# Patient Record
Sex: FEMALE | Race: WHITE | NOT HISPANIC OR LATINO | URBAN - METROPOLITAN AREA
[De-identification: names, ages, dates, MRNs, and addresses within clinical notes are randomized per-mention and may not be internally consistent; named-entity substitution may affect disease eponyms.]

---

## 2018-03-09 VITALS
DIASTOLIC BLOOD PRESSURE: 79 MMHG | RESPIRATION RATE: 18 BRPM | SYSTOLIC BLOOD PRESSURE: 129 MMHG | HEIGHT: 66 IN | HEART RATE: 78 BPM | OXYGEN SATURATION: 100 % | WEIGHT: 188.5 LBS | TEMPERATURE: 98 F

## 2018-03-09 NOTE — PATIENT PROFILE ADULT. - PSH
H/O abdominoplasty    H/O bilateral mastectomy Ankle fracture  left, s/p surgery  Clavicle fracture  s/p surgery  H/O abdominoplasty    H/O bilateral mastectomy  prophlatic  H/O:  section    History of ovarian cystectomy

## 2018-03-09 NOTE — PATIENT PROFILE ADULT. - PMH
Hodgkins disease  s/p chemo, radiation  Hypothyroidism GERD (gastroesophageal reflux disease)    Hodgkins disease  s/p chemo, radiation  Hypothyroidism

## 2018-03-12 ENCOUNTER — INPATIENT (INPATIENT)
Facility: HOSPITAL | Age: 48
LOS: 2 days | Discharge: HOME | DRG: 743 | End: 2018-03-15
Attending: OBSTETRICS & GYNECOLOGY | Admitting: OBSTETRICS & GYNECOLOGY
Payer: COMMERCIAL

## 2018-03-12 DIAGNOSIS — N83.11 CORPUS LUTEUM CYST OF RIGHT OVARY: ICD-10-CM

## 2018-03-12 DIAGNOSIS — S82.899A OTHER FRACTURE OF UNSPECIFIED LOWER LEG, INITIAL ENCOUNTER FOR CLOSED FRACTURE: Chronic | ICD-10-CM

## 2018-03-12 DIAGNOSIS — Z92.3 PERSONAL HISTORY OF IRRADIATION: ICD-10-CM

## 2018-03-12 DIAGNOSIS — Z98.890 OTHER SPECIFIED POSTPROCEDURAL STATES: Chronic | ICD-10-CM

## 2018-03-12 DIAGNOSIS — Z85.72 PERSONAL HISTORY OF NON-HODGKIN LYMPHOMAS: ICD-10-CM

## 2018-03-12 DIAGNOSIS — S42.009A FRACTURE OF UNSPECIFIED PART OF UNSPECIFIED CLAVICLE, INITIAL ENCOUNTER FOR CLOSED FRACTURE: Chronic | ICD-10-CM

## 2018-03-12 DIAGNOSIS — D25.9 LEIOMYOMA OF UTERUS, UNSPECIFIED: ICD-10-CM

## 2018-03-12 DIAGNOSIS — N92.0 EXCESSIVE AND FREQUENT MENSTRUATION WITH REGULAR CYCLE: ICD-10-CM

## 2018-03-12 DIAGNOSIS — D64.9 ANEMIA, UNSPECIFIED: ICD-10-CM

## 2018-03-12 DIAGNOSIS — Z98.890 OTHER SPECIFIED POSTPROCEDURAL STATES: ICD-10-CM

## 2018-03-12 DIAGNOSIS — Z98.891 HISTORY OF UTERINE SCAR FROM PREVIOUS SURGERY: Chronic | ICD-10-CM

## 2018-03-12 DIAGNOSIS — E03.9 HYPOTHYROIDISM, UNSPECIFIED: ICD-10-CM

## 2018-03-12 DIAGNOSIS — K21.9 GASTRO-ESOPHAGEAL REFLUX DISEASE WITHOUT ESOPHAGITIS: ICD-10-CM

## 2018-03-12 LAB
HCT VFR BLD CALC: 33.8 % — LOW (ref 34.5–45)
HGB BLD-MCNC: 11.2 G/DL — LOW (ref 11.5–15.5)
MCHC RBC-ENTMCNC: 28.2 PG — SIGNIFICANT CHANGE UP (ref 27–34)
MCHC RBC-ENTMCNC: 33.1 G/DL — SIGNIFICANT CHANGE UP (ref 32–36)
MCV RBC AUTO: 85.1 FL — SIGNIFICANT CHANGE UP (ref 80–100)
PLATELET # BLD AUTO: 242 K/UL — SIGNIFICANT CHANGE UP (ref 150–400)
RBC # BLD: 3.97 M/UL — SIGNIFICANT CHANGE UP (ref 3.8–5.2)
RBC # FLD: 14.6 % — SIGNIFICANT CHANGE UP (ref 10.3–16.9)
WBC # BLD: 13.8 K/UL — HIGH (ref 3.8–10.5)
WBC # FLD AUTO: 13.8 K/UL — HIGH (ref 3.8–10.5)

## 2018-03-12 RX ORDER — HYDROMORPHONE HYDROCHLORIDE 2 MG/ML
30 INJECTION INTRAMUSCULAR; INTRAVENOUS; SUBCUTANEOUS
Qty: 0 | Refills: 0 | Status: DISCONTINUED | OUTPATIENT
Start: 2018-03-12 | End: 2018-03-12

## 2018-03-12 RX ORDER — ONDANSETRON 8 MG/1
4 TABLET, FILM COATED ORAL EVERY 6 HOURS
Qty: 0 | Refills: 0 | Status: DISCONTINUED | OUTPATIENT
Start: 2018-03-12 | End: 2018-03-15

## 2018-03-12 RX ORDER — MORPHINE SULFATE 50 MG/1
30 CAPSULE, EXTENDED RELEASE ORAL
Qty: 0 | Refills: 0 | Status: DISCONTINUED | OUTPATIENT
Start: 2018-03-12 | End: 2018-03-13

## 2018-03-12 RX ORDER — SIMETHICONE 80 MG/1
80 TABLET, CHEWABLE ORAL
Qty: 0 | Refills: 0 | Status: DISCONTINUED | OUTPATIENT
Start: 2018-03-12 | End: 2018-03-15

## 2018-03-12 RX ORDER — BUPIVACAINE 13.3 MG/ML
20 INJECTION, SUSPENSION, LIPOSOMAL INFILTRATION ONCE
Qty: 0 | Refills: 0 | Status: DISCONTINUED | OUTPATIENT
Start: 2018-03-12 | End: 2018-03-13

## 2018-03-12 RX ORDER — SODIUM CHLORIDE 9 MG/ML
1000 INJECTION, SOLUTION INTRAVENOUS
Qty: 0 | Refills: 0 | Status: DISCONTINUED | OUTPATIENT
Start: 2018-03-12 | End: 2018-03-13

## 2018-03-12 RX ORDER — HYDROMORPHONE HYDROCHLORIDE 2 MG/ML
0.5 INJECTION INTRAMUSCULAR; INTRAVENOUS; SUBCUTANEOUS ONCE
Qty: 0 | Refills: 0 | Status: DISCONTINUED | OUTPATIENT
Start: 2018-03-12 | End: 2018-03-15

## 2018-03-12 RX ORDER — NALOXONE HYDROCHLORIDE 4 MG/.1ML
0.1 SPRAY NASAL
Qty: 0 | Refills: 0 | Status: DISCONTINUED | OUTPATIENT
Start: 2018-03-12 | End: 2018-03-13

## 2018-03-12 RX ORDER — MORPHINE SULFATE 50 MG/1
2 CAPSULE, EXTENDED RELEASE ORAL
Qty: 0 | Refills: 0 | Status: DISCONTINUED | OUTPATIENT
Start: 2018-03-12 | End: 2018-03-14

## 2018-03-12 RX ORDER — HEPARIN SODIUM 5000 [USP'U]/ML
5000 INJECTION INTRAVENOUS; SUBCUTANEOUS EVERY 12 HOURS
Qty: 0 | Refills: 0 | Status: DISCONTINUED | OUTPATIENT
Start: 2018-03-12 | End: 2018-03-15

## 2018-03-12 RX ADMIN — MORPHINE SULFATE 30 MILLILITER(S): 50 CAPSULE, EXTENDED RELEASE ORAL at 18:50

## 2018-03-12 RX ADMIN — ONDANSETRON 4 MILLIGRAM(S): 8 TABLET, FILM COATED ORAL at 21:46

## 2018-03-12 NOTE — PROGRESS NOTE ADULT - ASSESSMENT
47y Female POD#0 s/p SENIA, BS, JUANJO, and TAP block, stable.     1. Neuro/Pain:  PCA  2  CV:   VS per routine  3. Pulm: Encourage ISS  4. GI: NPO + Sips, Advance in the AM  5. :  [place]illo in place, TOV in AM  6. Heme: PACU CBC stable, f/u AM CBC  7. ID: --  8. DVT ppx: SCDs  9. Dispo: Likely POD#2 or 3

## 2018-03-12 NOTE — PROGRESS NOTE ADULT - SUBJECTIVE AND OBJECTIVE BOX
Patient seen at bedside for post op check. Pain is well controlled with a PCA. Patient has a aden and SCDs. Has not yet passed flatus or ambulated. NPO. Denies headache, dizziness, nausea/vomiting, shortness of breath, palpitations, heavy bleeding.     T(C): 36 (03-12-18 @ 22:00), Max: 36.3 (03-12-18 @ 21:25)  HR: 82 (03-12-18 @ 22:00) (80 - 94)  BP: 123/72 (03-12-18 @ 22:00) (114/82 - 131/69)  RR: 16 (03-12-18 @ 22:00) (9 - 24)  SpO2: 98% (03-12-18 @ 22:00) (98% - 100%)    Gen: NAD, AO x3  Chest: normal work of breathing  Abdomen: soft, nontender, nondistended, no rebound or guarding, normal bowel sounds  Incision: dressing clean, dry, intact  : aden draining clear urine  Extremities: SCDs on      03-12-18 @ 07:01  -  03-12-18 @ 23:31  --------------------------------------------------------  IN: 625 mL / OUT: 385 mL / NET: 240 mL                          11.2   13.8  )-----------( 242      ( 12 Mar 2018 20:31 )             33.8

## 2018-03-13 LAB
ANION GAP SERPL CALC-SCNC: 12 MMOL/L — SIGNIFICANT CHANGE UP (ref 5–17)
BUN SERPL-MCNC: 12 MG/DL — SIGNIFICANT CHANGE UP (ref 7–23)
CALCIUM SERPL-MCNC: 9 MG/DL — SIGNIFICANT CHANGE UP (ref 8.4–10.5)
CHLORIDE SERPL-SCNC: 101 MMOL/L — SIGNIFICANT CHANGE UP (ref 96–108)
CO2 SERPL-SCNC: 23 MMOL/L — SIGNIFICANT CHANGE UP (ref 22–31)
CREAT SERPL-MCNC: 0.62 MG/DL — SIGNIFICANT CHANGE UP (ref 0.5–1.3)
GLUCOSE SERPL-MCNC: 141 MG/DL — HIGH (ref 70–99)
HCT VFR BLD CALC: 32.4 % — LOW (ref 34.5–45)
HGB BLD-MCNC: 10.7 G/DL — LOW (ref 11.5–15.5)
MAGNESIUM SERPL-MCNC: 2 MG/DL — SIGNIFICANT CHANGE UP (ref 1.6–2.6)
MCHC RBC-ENTMCNC: 28.3 PG — SIGNIFICANT CHANGE UP (ref 27–34)
MCHC RBC-ENTMCNC: 33 G/DL — SIGNIFICANT CHANGE UP (ref 32–36)
MCV RBC AUTO: 85.7 FL — SIGNIFICANT CHANGE UP (ref 80–100)
PHOSPHATE SERPL-MCNC: 3.8 MG/DL — SIGNIFICANT CHANGE UP (ref 2.5–4.5)
PLATELET # BLD AUTO: 281 K/UL — SIGNIFICANT CHANGE UP (ref 150–400)
POTASSIUM SERPL-MCNC: 4.4 MMOL/L — SIGNIFICANT CHANGE UP (ref 3.5–5.3)
POTASSIUM SERPL-SCNC: 4.4 MMOL/L — SIGNIFICANT CHANGE UP (ref 3.5–5.3)
RBC # BLD: 3.78 M/UL — LOW (ref 3.8–5.2)
RBC # FLD: 14.6 % — SIGNIFICANT CHANGE UP (ref 10.3–16.9)
SODIUM SERPL-SCNC: 136 MMOL/L — SIGNIFICANT CHANGE UP (ref 135–145)
WBC # BLD: 11.6 K/UL — HIGH (ref 3.8–10.5)
WBC # FLD AUTO: 11.6 K/UL — HIGH (ref 3.8–10.5)

## 2018-03-13 RX ORDER — LEVOTHYROXINE SODIUM 125 MCG
150 TABLET ORAL DAILY
Qty: 0 | Refills: 0 | Status: DISCONTINUED | OUTPATIENT
Start: 2018-03-13 | End: 2018-03-15

## 2018-03-13 RX ORDER — BENZOCAINE AND MENTHOL 5; 1 G/100ML; G/100ML
1 LIQUID ORAL
Qty: 0 | Refills: 0 | Status: DISCONTINUED | OUTPATIENT
Start: 2018-03-13 | End: 2018-03-15

## 2018-03-13 RX ORDER — ACETAMINOPHEN 500 MG
975 TABLET ORAL EVERY 8 HOURS
Qty: 0 | Refills: 0 | Status: COMPLETED | OUTPATIENT
Start: 2018-03-13 | End: 2018-03-13

## 2018-03-13 RX ORDER — DOCUSATE SODIUM 100 MG
100 CAPSULE ORAL DAILY
Qty: 0 | Refills: 0 | Status: DISCONTINUED | OUTPATIENT
Start: 2018-03-13 | End: 2018-03-15

## 2018-03-13 RX ORDER — IBUPROFEN 200 MG
600 TABLET ORAL EVERY 6 HOURS
Qty: 0 | Refills: 0 | Status: DISCONTINUED | OUTPATIENT
Start: 2018-03-13 | End: 2018-03-15

## 2018-03-13 RX ORDER — OXYCODONE AND ACETAMINOPHEN 5; 325 MG/1; MG/1
1 TABLET ORAL EVERY 4 HOURS
Qty: 0 | Refills: 0 | Status: DISCONTINUED | OUTPATIENT
Start: 2018-03-13 | End: 2018-03-15

## 2018-03-13 RX ORDER — OXYCODONE AND ACETAMINOPHEN 5; 325 MG/1; MG/1
2 TABLET ORAL EVERY 4 HOURS
Qty: 0 | Refills: 0 | Status: DISCONTINUED | OUTPATIENT
Start: 2018-03-13 | End: 2018-03-15

## 2018-03-13 RX ADMIN — ONDANSETRON 4 MILLIGRAM(S): 8 TABLET, FILM COATED ORAL at 07:00

## 2018-03-13 RX ADMIN — Medication 30 MILLILITER(S): at 22:33

## 2018-03-13 RX ADMIN — SIMETHICONE 80 MILLIGRAM(S): 80 TABLET, CHEWABLE ORAL at 21:22

## 2018-03-13 RX ADMIN — SIMETHICONE 80 MILLIGRAM(S): 80 TABLET, CHEWABLE ORAL at 17:57

## 2018-03-13 RX ADMIN — Medication 975 MILLIGRAM(S): at 08:00

## 2018-03-13 RX ADMIN — Medication 100 MILLIGRAM(S): at 09:31

## 2018-03-13 RX ADMIN — OXYCODONE AND ACETAMINOPHEN 1 TABLET(S): 5; 325 TABLET ORAL at 22:31

## 2018-03-13 RX ADMIN — SIMETHICONE 80 MILLIGRAM(S): 80 TABLET, CHEWABLE ORAL at 05:50

## 2018-03-13 RX ADMIN — HEPARIN SODIUM 5000 UNIT(S): 5000 INJECTION INTRAVENOUS; SUBCUTANEOUS at 05:50

## 2018-03-13 RX ADMIN — OXYCODONE AND ACETAMINOPHEN 1 TABLET(S): 5; 325 TABLET ORAL at 21:22

## 2018-03-13 RX ADMIN — Medication 975 MILLIGRAM(S): at 07:14

## 2018-03-13 RX ADMIN — SIMETHICONE 80 MILLIGRAM(S): 80 TABLET, CHEWABLE ORAL at 00:19

## 2018-03-13 RX ADMIN — HEPARIN SODIUM 5000 UNIT(S): 5000 INJECTION INTRAVENOUS; SUBCUTANEOUS at 17:57

## 2018-03-13 RX ADMIN — Medication 600 MILLIGRAM(S): at 12:25

## 2018-03-13 RX ADMIN — Medication 150 MICROGRAM(S): at 09:31

## 2018-03-13 RX ADMIN — SIMETHICONE 80 MILLIGRAM(S): 80 TABLET, CHEWABLE ORAL at 11:36

## 2018-03-13 RX ADMIN — Medication 600 MILLIGRAM(S): at 13:25

## 2018-03-13 NOTE — PROGRESS NOTE ADULT - SUBJECTIVE AND OBJECTIVE BOX
Patient evaluated at bedside. No acute events overnight. She reports pain is well controlled with PCA. Adequate urine output. Has not yet had anything PO. Ambulating w/o issue. Has not passed flatus as of yet. She denies headache, dizziness, chest pain, palpitations, shortness of breathe, nausea, vomiting or heavy vaginal bleeding.      Physical Exam:  Vital Signs Last 24 Hrs  T(C): 35.9 (13 Mar 2018 05:01), Max: 36.3 (12 Mar 2018 21:25)  T(F): 96.6 (13 Mar 2018 05:01), Max: 97.4 (12 Mar 2018 21:25)  HR: 89 (13 Mar 2018 05:01) (77 - 94)  BP: 97/62 (13 Mar 2018 05:01) (97/62 - 131/69)  BP(mean): 103 (12 Mar 2018 19:33) (83 - 103)  RR: 16 (13 Mar 2018 05:01) (9 - 24)  SpO2: 97% (13 Mar 2018 05:01) (97% - 100%)    GA: NAD, A+0 x 3  Abd: + BS, soft, nontender, nondistended, no rebound or guarding  Incision clean, dry and intact - steri strips  Extremities: no swelling or calf tenderness                            11.2   13.8  )-----------( 242      ( 12 Mar 2018 20:31 )             33.8

## 2018-03-13 NOTE — PROGRESS NOTE ADULT - SUBJECTIVE AND OBJECTIVE BOX
Patient stable afebrile, nausea resolved, VSS  tolerated po liquids after zofran, no flatus  Abdomen slightly distended, non tender  Incision clean and dry  Advance diet with flatus

## 2018-03-13 NOTE — PROGRESS NOTE ADULT - ASSESSMENT
46yo POD1 s/p supracervical hysterectomy, bilateral salpingectomy, lysis of adhesions, TAP block. Pt is hemodynamically stable.     1. VSS- continue to monitor per protocol  2. Pain control with Percocet or Motrin as needed   3. DVT ppx: SCDs  4. Pulm: incentive spirometer at least 10 times per hour   5. GI: Diet clears   6. Activity- ambulate as tolerated

## 2018-03-13 NOTE — PROGRESS NOTE ADULT - SUBJECTIVE AND OBJECTIVE BOX
Pt seen and examined at bedside. Pt states mild abdominal pain controlled with pain medication. Pt is ambulating, tolerating clear diet, did not pass flatus yet. Pt is urinating adequately.   Pt denies fever, chills, chest pain, SOB, nausea, vomiting, lightheadedness, dizziness.      T(F): 97 (03-13-18 @ 10:13), Max: 97.4 (03-12-18 @ 21:25)  HR: 84 (03-13-18 @ 10:13) (77 - 94)  BP: 96/68 (03-13-18 @ 10:13) (96/68 - 131/69)  RR: 20 (03-13-18 @ 10:13) (9 - 24)  SpO2: 96% (03-13-18 @ 10:13) (96% - 100%)  Wt(kg): --  I&O's Summary    12 Mar 2018 07:01  -  13 Mar 2018 07:00  --------------------------------------------------------  IN: 1875 mL / OUT: 1285 mL / NET: 590 mL    13 Mar 2018 07:01  -  13 Mar 2018 12:37  --------------------------------------------------------  IN: 0 mL / OUT: 200 mL / NET: -200 mL    MEDICATIONS  (STANDING):  docusate sodium 100 milliGRAM(s) Oral daily  heparin  Injectable 5000 Unit(s) SubCutaneous every 12 hours  HYDROmorphone  Injectable 0.5 milliGRAM(s) IV Push once  levothyroxine 150 MICROGram(s) Oral daily  simethicone 80 milliGRAM(s) Chew five times a day    MEDICATIONS  (PRN):  ibuprofen  Tablet 600 milliGRAM(s) Oral every 6 hours PRN mild pain  morphine  - Injectable 2 milliGRAM(s) IV Push every 10 minutes PRN Severe Pain (7 - 10)  ondansetron Injectable 4 milliGRAM(s) IV Push every 6 hours PRN Nausea  oxyCODONE    5 mG/acetaminophen 325 mG 2 Tablet(s) Oral every 4 hours PRN Severe Pain (7 - 10)  oxyCODONE    5 mG/acetaminophen 325 mG 1 Tablet(s) Oral every 4 hours PRN Moderate Pain (4 - 6)    Physical Exam:  Constitutional: NAD  Abdomen: incision site clean, dry, intact. Soft, mildly tender, nondistended, no guarding, no rebound, +bowel sounds  Extremities: no lower extremity edema or calve tenderness. SCDs in place     LABS:                        10.7   11.6  )-----------( 281      ( 13 Mar 2018 06:56 )             32.4     03-13    136  |  101  |  12  ----------------------------<  141<H>  4.4   |  23  |  0.62    Ca    9.0      13 Mar 2018 06:56  Phos  3.8     03-13  Mg     2.0     03-13

## 2018-03-13 NOTE — PROGRESS NOTE ADULT - ASSESSMENT
47y Female POD#1 s/p SENIA, BS, JUANJO, and TAP block, stable.     1. Neuro/Pain:  OPM  2  CV:   VS per routine  3. Pulm: Encourage ISS  4. GI: NPO + Sips, Advance to clears in the AM  5. :  TOV  6. Heme: PACU CBC stable, f/u AM CBC  7. ID: --  8. DVT ppx: SCDs  9. Dispo: Likely POD#2 or 3

## 2018-03-14 RX ORDER — METFORMIN HYDROCHLORIDE 850 MG/1
1 TABLET ORAL
Qty: 0 | Refills: 0 | COMMUNITY

## 2018-03-14 RX ORDER — DIPHENHYDRAMINE HCL 50 MG
25 CAPSULE ORAL AT BEDTIME
Qty: 0 | Refills: 0 | Status: DISCONTINUED | OUTPATIENT
Start: 2018-03-14 | End: 2018-03-15

## 2018-03-14 RX ORDER — LEVOTHYROXINE SODIUM 125 MCG
1 TABLET ORAL
Qty: 0 | Refills: 0 | COMMUNITY

## 2018-03-14 RX ORDER — DIPHENHYDRAMINE HCL 50 MG
25 CAPSULE ORAL ONCE
Qty: 0 | Refills: 0 | Status: COMPLETED | OUTPATIENT
Start: 2018-03-14 | End: 2018-03-14

## 2018-03-14 RX ADMIN — Medication 600 MILLIGRAM(S): at 13:19

## 2018-03-14 RX ADMIN — SIMETHICONE 80 MILLIGRAM(S): 80 TABLET, CHEWABLE ORAL at 06:24

## 2018-03-14 RX ADMIN — Medication 600 MILLIGRAM(S): at 05:23

## 2018-03-14 RX ADMIN — OXYCODONE AND ACETAMINOPHEN 1 TABLET(S): 5; 325 TABLET ORAL at 10:50

## 2018-03-14 RX ADMIN — OXYCODONE AND ACETAMINOPHEN 1 TABLET(S): 5; 325 TABLET ORAL at 04:14

## 2018-03-14 RX ADMIN — OXYCODONE AND ACETAMINOPHEN 1 TABLET(S): 5; 325 TABLET ORAL at 16:45

## 2018-03-14 RX ADMIN — Medication 600 MILLIGRAM(S): at 14:00

## 2018-03-14 RX ADMIN — SIMETHICONE 80 MILLIGRAM(S): 80 TABLET, CHEWABLE ORAL at 22:08

## 2018-03-14 RX ADMIN — HEPARIN SODIUM 5000 UNIT(S): 5000 INJECTION INTRAVENOUS; SUBCUTANEOUS at 18:39

## 2018-03-14 RX ADMIN — Medication 600 MILLIGRAM(S): at 00:16

## 2018-03-14 RX ADMIN — Medication 150 MICROGRAM(S): at 06:24

## 2018-03-14 RX ADMIN — Medication 600 MILLIGRAM(S): at 08:20

## 2018-03-14 RX ADMIN — Medication 600 MILLIGRAM(S): at 22:07

## 2018-03-14 RX ADMIN — Medication 25 MILLIGRAM(S): at 22:08

## 2018-03-14 RX ADMIN — HEPARIN SODIUM 5000 UNIT(S): 5000 INJECTION INTRAVENOUS; SUBCUTANEOUS at 06:24

## 2018-03-14 RX ADMIN — OXYCODONE AND ACETAMINOPHEN 1 TABLET(S): 5; 325 TABLET ORAL at 16:09

## 2018-03-14 RX ADMIN — OXYCODONE AND ACETAMINOPHEN 1 TABLET(S): 5; 325 TABLET ORAL at 11:30

## 2018-03-14 RX ADMIN — Medication 25 MILLIGRAM(S): at 23:02

## 2018-03-14 RX ADMIN — Medication 600 MILLIGRAM(S): at 06:28

## 2018-03-14 RX ADMIN — OXYCODONE AND ACETAMINOPHEN 1 TABLET(S): 5; 325 TABLET ORAL at 04:47

## 2018-03-14 RX ADMIN — Medication 600 MILLIGRAM(S): at 23:02

## 2018-03-14 RX ADMIN — OXYCODONE AND ACETAMINOPHEN 2 TABLET(S): 5; 325 TABLET ORAL at 23:38

## 2018-03-14 RX ADMIN — OXYCODONE AND ACETAMINOPHEN 2 TABLET(S): 5; 325 TABLET ORAL at 23:02

## 2018-03-14 RX ADMIN — SIMETHICONE 80 MILLIGRAM(S): 80 TABLET, CHEWABLE ORAL at 16:09

## 2018-03-14 RX ADMIN — Medication 100 MILLIGRAM(S): at 10:50

## 2018-03-14 RX ADMIN — BENZOCAINE AND MENTHOL 1 LOZENGE: 5; 1 LIQUID ORAL at 00:16

## 2018-03-14 RX ADMIN — SIMETHICONE 80 MILLIGRAM(S): 80 TABLET, CHEWABLE ORAL at 10:50

## 2018-03-14 NOTE — PROGRESS NOTE ADULT - ASSESSMENT
46yo POD2 s/p supracervical hysterectomy, bilateral salpingectomy, lysis of adhesions, TAP block. Pt is hemodynamically stable.     1. VSS- continue to monitor per protocol  2. Pain control with Percocet or Motrin as needed   3. DVT ppx: SCDs  4. Pulm: incentive spirometer at least 10 times per hour   5. GI: Diet reg  6. Activity- ambulate as tolerated

## 2018-03-14 NOTE — DISCHARGE NOTE ADULT - CARE PLAN
Principal Discharge DX:	Uterine leiomyoma, unspecified location  Goal:	recovery  Assessment and plan of treatment:	Follow up with your GYN in 1-2 weeks. Call the office to schedule your appointment  Regular diet. No heavy lifting. Pelvic rest for 6 weeks.  This includes no tampons, douching or intercourse. Call with any signs of symptoms of infection including fever > 100.4 degrees, severe pain, malodorous vaginal discharge or bleeding > 1 pad/hour. Motrin 600 mg orally every 6 hours for pain.

## 2018-03-14 NOTE — DISCHARGE NOTE ADULT - PLAN OF CARE
recovery Follow up with your GYN in 1-2 weeks. Call the office to schedule your appointment  Regular diet. No heavy lifting. Pelvic rest for 6 weeks.  This includes no tampons, douching or intercourse. Call with any signs of symptoms of infection including fever > 100.4 degrees, severe pain, malodorous vaginal discharge or bleeding > 1 pad/hour. Motrin 600 mg orally every 6 hours for pain.

## 2018-03-14 NOTE — PROGRESS NOTE ADULT - SUBJECTIVE AND OBJECTIVE BOX
Patient seen at bedside for complaints of chills and abdominal pressure. Feeling better since nurse gave her motrin. Afebrile. Ate sweet potatoe and quinoa for dinner, ambulating, voiding, passing flatus. Took a benadryl but does not feel somnolent.    Vital Signs Last 24 Hrs  T(C): 36.7 (14 Mar 2018 21:16), Max: 37.1 (14 Mar 2018 06:33)  T(F): 98.1 (14 Mar 2018 21:16), Max: 98.8 (14 Mar 2018 06:33)  HR: 81 (14 Mar 2018 21:16) (81 - 95)  BP: 94/56 (14 Mar 2018 21:16) (94/56 - 107/58)  BP(mean): --  RR: 16 (14 Mar 2018 21:16) (16 - 18)  SpO2: 97% (14 Mar 2018 16:06) (96% - 99%)    Gen: NAD, AOx3  Chest: no respiratory distress  Abdomen: soft, nondistended, nontender, no rebound/guarding  Incision: well approximated, no erythema or drainage  Extremites: no warmth, edema, or calf tenderness    A/P POD2 s/p SENIA, BS, JUANJO, no signs of infection, afebrile, benign exam.  -Continue pain control-patient due for oxycodone now, nurse to administer  -Encourage incentive spirometer  -Will give an additional 25 mg of benadryl prn insomnia  -Continue to monitor VS and signs of infection

## 2018-03-14 NOTE — PROGRESS NOTE ADULT - SUBJECTIVE AND OBJECTIVE BOX
Patient evaluated at bedside. She reports pain is well controlled with OPM. She has been ambulating without assistance, voiding spontaneously, passing gas, tolerating PO. She denies headache, dizziness, chest pain, palpitations, shortness of breath, nausea, vomiting or heavy vaginal bleeding.      Physical Exam:  Vital Signs Last 24 Hrs  T(C): 37.1 (14 Mar 2018 06:33), Max: 37.1 (13 Mar 2018 21:16)  T(F): 98.8 (14 Mar 2018 06:33), Max: 98.8 (14 Mar 2018 06:33)  HR: 88 (14 Mar 2018 06:33) (84 - 101)  BP: 100/71 (14 Mar 2018 06:33) (96/68 - 112/75)  BP(mean): --  RR: 18 (14 Mar 2018 06:33) (18 - 20)  SpO2: 99% (14 Mar 2018 06:33) (96% - 99%)    GA: NAD, A+0 x 3  Abd: + BS, soft, nontender, nondistended, no rebound or guarding,   Incision clean, dry and intact  : lochia WNL  Extremities: no swelling or calf tenderness                            10.7   11.6  )-----------( 281      ( 13 Mar 2018 06:56 )             32.4     03-13    136  |  101  |  12  ----------------------------<  141<H>  4.4   |  23  |  0.62    Ca    9.0      13 Mar 2018 06:56  Phos  3.8     03-13  Mg     2.0     03-13

## 2018-03-14 NOTE — PROGRESS NOTE ADULT - SUBJECTIVE AND OBJECTIVE BOX
Patient evaluated at bedside. She reports pain is well controlled with OPM. She has been ambulating without assistance, voiding spontaneously, passing gas, tolerating regular diet. She denies headache, dizziness, chest pain, palpitations, shortness of breath, nausea, vomiting or heavy vaginal bleeding.      Physical Exam:  Vital Signs Last 24 Hrs  T(C): 36.4 (14 Mar 2018 16:06), Max: 37.1 (13 Mar 2018 21:16)  T(F): 97.5 (14 Mar 2018 16:06), Max: 98.8 (14 Mar 2018 06:33)  HR: 95 (14 Mar 2018 16:06) (82 - 101)  BP: 107/58 (14 Mar 2018 16:06) (100/71 - 112/75)  BP(mean): --  RR: 16 (14 Mar 2018 16:06) (16 - 18)  SpO2: 97% (14 Mar 2018 16:06) (96% - 99%)    GA: NAD, A+0 x 3  Abd: + BS, soft, nontender, nondistended, no rebound or guarding  Incision clean, dry and intact  : lochia WNL  Extremities: no swelling or calf tenderness                            10.7   11.6  )-----------( 281      ( 13 Mar 2018 06:56 )             32.4     03-13    136  |  101  |  12  ----------------------------<  141<H>  4.4   |  23  |  0.62    Ca    9.0      13 Mar 2018 06:56  Phos  3.8     03-13  Mg     2.0     03-13

## 2018-03-14 NOTE — DISCHARGE NOTE ADULT - PATIENT PORTAL LINK FT
You can access the [facility] Patient Portal, offered by [facility], by registering with the following website: [email or web address]

## 2018-03-14 NOTE — PROGRESS NOTE ADULT - ASSESSMENT
48yo POD2 s/p supracervical hysterectomy, bilateral salpingectomy, lysis of adhesions, TAP block. Pt is hemodynamically stable.     1. VSS- continue to monitor per protocol  2. Pain control with Percocet or Motrin as needed   3. DVT ppx: SCDs  4. Pulm: incentive spirometer at least 10 times per hour   5. GI: Diet reg  6. Activity- ambulate as tolerated

## 2018-03-14 NOTE — DISCHARGE NOTE ADULT - CARE PROVIDER_API CALL
Carmella Perkins), Obstetrics and Gynecology  [address] [place] [address]  Phone: [phone]  Fax: [phone]

## 2018-03-14 NOTE — PROGRESS NOTE ADULT - SUBJECTIVE AND OBJECTIVE BOX
POD 2 - SENIA bilateral salpingectomy  Stable, afebrile, VSS  herrera po well no nausea or vomiting  +flatus - BM  abdomen soft not distended  Incision - Clean and dry  for discharge home tomorrow am if afebrile,   follow up 2 weeks post op

## 2018-03-15 VITALS
HEART RATE: 93 BPM | TEMPERATURE: 99 F | DIASTOLIC BLOOD PRESSURE: 68 MMHG | SYSTOLIC BLOOD PRESSURE: 106 MMHG | RESPIRATION RATE: 15 BRPM | OXYGEN SATURATION: 98 %

## 2018-03-15 PROCEDURE — 83735 ASSAY OF MAGNESIUM: CPT

## 2018-03-15 PROCEDURE — 80048 BASIC METABOLIC PNL TOTAL CA: CPT

## 2018-03-15 PROCEDURE — C1889: CPT

## 2018-03-15 PROCEDURE — 85027 COMPLETE CBC AUTOMATED: CPT

## 2018-03-15 PROCEDURE — 86900 BLOOD TYPING SEROLOGIC ABO: CPT

## 2018-03-15 PROCEDURE — 86901 BLOOD TYPING SEROLOGIC RH(D): CPT

## 2018-03-15 PROCEDURE — 88304 TISSUE EXAM BY PATHOLOGIST: CPT

## 2018-03-15 PROCEDURE — 86850 RBC ANTIBODY SCREEN: CPT

## 2018-03-15 PROCEDURE — 88309 TISSUE EXAM BY PATHOLOGIST: CPT

## 2018-03-15 PROCEDURE — 84100 ASSAY OF PHOSPHORUS: CPT

## 2018-03-15 PROCEDURE — 88311 DECALCIFY TISSUE: CPT

## 2018-03-15 PROCEDURE — 36415 COLL VENOUS BLD VENIPUNCTURE: CPT

## 2018-03-15 PROCEDURE — 88300 SURGICAL PATH GROSS: CPT

## 2018-03-15 RX ADMIN — Medication 600 MILLIGRAM(S): at 05:13

## 2018-03-15 RX ADMIN — Medication 150 MICROGRAM(S): at 05:27

## 2018-03-15 RX ADMIN — Medication 25 MILLIGRAM(S): at 05:28

## 2018-03-15 RX ADMIN — SIMETHICONE 80 MILLIGRAM(S): 80 TABLET, CHEWABLE ORAL at 05:27

## 2018-03-15 RX ADMIN — Medication 600 MILLIGRAM(S): at 07:32

## 2018-03-15 NOTE — PROGRESS NOTE ADULT - SUBJECTIVE AND OBJECTIVE BOX
Patient evaluated at bedside. She reports pain is well controlled with OPM. She has been ambulating without assistance, voiding spontaneously, passing gas, tolerating regular diet. She denies headache, dizziness, chest pain, palpitations, shortness of breath, nausea, vomiting or heavy vaginal bleeding.      Physical Exam:  Vital Signs Last 24 Hrs  T(C): 37 (15 Mar 2018 04:48), Max: 37 (15 Mar 2018 04:48)  T(F): 98.6 (15 Mar 2018 04:48), Max: 98.6 (15 Mar 2018 04:48)  HR: 83 (15 Mar 2018 04:48) (81 - 95)  BP: 93/56 (15 Mar 2018 04:48) (93/56 - 107/58)  BP(mean): --  RR: 16 (15 Mar 2018 04:48) (16 - 16)  SpO2: 97% (14 Mar 2018 16:06) (96% - 97%)    GA: NAD, A+0 x 3  Abd: + BS, soft, nontender, nondistended, no rebound or guarding  Incision clean, dry and intact  : lochia WNL  Extremities: no swelling or calf tenderness                            10.7   11.6  )-----------( 281      ( 13 Mar 2018 06:56 )             32.4     03-13    136  |  101  |  12  ----------------------------<  141<H>  4.4   |  23  |  0.62    Ca    9.0      13 Mar 2018 06:56  Phos  3.8     03-13  Mg     2.0     03-13

## 2018-03-15 NOTE — PROGRESS NOTE ADULT - ASSESSMENT
48yo POD3 s/p supracervical hysterectomy, bilateral salpingectomy, lysis of adhesions, TAP block. Pt is hemodynamically stable.     1. VSS- continue to monitor per protocol  2. Pain control with Percocet or Motrin as needed   3. DVT ppx: SCDs  4. Pulm: incentive spirometer at least 10 times per hour   5. GI: Diet reg  6. Activity- ambulate as tolerated

## 2018-03-16 LAB — SURGICAL PATHOLOGY STUDY: SIGNIFICANT CHANGE UP

## 2023-01-01 NOTE — PRE-OP CHECKLIST - TEMPERATURE IN CELSIUS (DEGREES C)
36.6
Site: Forehead (19 Dec 2023 12:36)  Bilirubin: 4.8 (19 Dec 2023 12:36)  Bilirubin Comment: 24.5 HOL, PT 12.4 (19 Dec 2023 12:36)